# Patient Record
Sex: MALE | Race: WHITE | NOT HISPANIC OR LATINO | URBAN - METROPOLITAN AREA
[De-identification: names, ages, dates, MRNs, and addresses within clinical notes are randomized per-mention and may not be internally consistent; named-entity substitution may affect disease eponyms.]

---

## 2020-12-06 ENCOUNTER — EMERGENCY (EMERGENCY)
Facility: HOSPITAL | Age: 65
LOS: 1 days | Discharge: HOME | End: 2020-12-06
Attending: STUDENT IN AN ORGANIZED HEALTH CARE EDUCATION/TRAINING PROGRAM
Payer: COMMERCIAL

## 2020-12-06 VITALS
HEIGHT: 69 IN | DIASTOLIC BLOOD PRESSURE: 95 MMHG | WEIGHT: 164.91 LBS | OXYGEN SATURATION: 99 % | TEMPERATURE: 99 F | HEART RATE: 58 BPM | SYSTOLIC BLOOD PRESSURE: 152 MMHG | RESPIRATION RATE: 16 BRPM

## 2020-12-06 PROCEDURE — 72100 X-RAY EXAM L-S SPINE 2/3 VWS: CPT | Mod: 26

## 2020-12-06 PROCEDURE — 99284 EMERGENCY DEPT VISIT MOD MDM: CPT

## 2020-12-06 PROCEDURE — 72100 X-RAY EXAM L-S SPINE 2/3 VWS: CPT

## 2020-12-06 RX ORDER — ACETAMINOPHEN 500 MG
975 TABLET ORAL ONCE
Refills: 0 | Status: COMPLETED | OUTPATIENT
Start: 2020-12-06 | End: 2020-12-06

## 2020-12-06 RX ORDER — OXYCODONE HYDROCHLORIDE 5 MG/1
5 TABLET ORAL ONCE
Refills: 0 | Status: DISCONTINUED | OUTPATIENT
Start: 2020-12-06 | End: 2020-12-06

## 2020-12-06 RX ORDER — IBUPROFEN 200 MG
600 TABLET ORAL ONCE
Refills: 0 | Status: COMPLETED | OUTPATIENT
Start: 2020-12-06 | End: 2020-12-06

## 2020-12-06 RX ORDER — LIDOCAINE 4 G/100G
1 CREAM TOPICAL ONCE
Refills: 0 | Status: COMPLETED | OUTPATIENT
Start: 2020-12-06 | End: 2020-12-06

## 2020-12-06 RX ORDER — DIAZEPAM 5 MG
5 TABLET ORAL ONCE
Refills: 0 | Status: DISCONTINUED | OUTPATIENT
Start: 2020-12-06 | End: 2020-12-06

## 2020-12-06 RX ADMIN — Medication 975 MILLIGRAM(S): at 22:02

## 2020-12-06 RX ADMIN — OXYCODONE HYDROCHLORIDE 5 MILLIGRAM(S): 5 TABLET ORAL at 22:55

## 2020-12-06 RX ADMIN — Medication 5 MILLIGRAM(S): at 22:01

## 2020-12-06 RX ADMIN — Medication 600 MILLIGRAM(S): at 22:40

## 2020-12-06 RX ADMIN — Medication 600 MILLIGRAM(S): at 22:01

## 2020-12-06 RX ADMIN — LIDOCAINE 1 PATCH: 4 CREAM TOPICAL at 22:01

## 2020-12-06 RX ADMIN — Medication 975 MILLIGRAM(S): at 22:40

## 2020-12-06 NOTE — ED PROVIDER NOTE - PATIENT PORTAL LINK FT
You can access the FollowMyHealth Patient Portal offered by [facility] by registering at the following website: [email or web address]. By joining [facility]’s FollowMyHealth portal, you will also be able to view your health information using other applications (apps) compatible with our system.

## 2020-12-06 NOTE — ED ADULT NURSE NOTE - NSIMPLEMENTINTERV_GEN_ALL_ED
Implemented All Fall Risk Interventions:  [place] to call system. Call bell, personal items and telephone within reach. Instruct patient to call for assistance. Room bathroom lighting operational. Non-slip footwear when patient is off stretcher. Physically safe environment: no spills, clutter or unnecessary equipment. Stretcher in lowest position, wheels locked, appropriate side rails in place. Provide visual cue, wrist band, yellow gown, etc. Monitor gait and stability. Monitor for mental status changes and reorient to person, place, and time. Review medications for side effects contributing to fall risk. Reinforce activity limits and safety measures with patient and family.

## 2020-12-06 NOTE — ED PROVIDER NOTE - CLINICAL SUMMARY MEDICAL DECISION MAKING FREE TEXT BOX
Dr. Roxane Finney: 65 year old male with history of HLD presented to ED with acute atraumatic right low back pain. Pt states he had prior lumbar disc disease but uncertain what the diagnosis was. No LE numbness/weakness/paresthesia, urinary retentions, saddle anesthesia, fever, chills. Plan: Lumbar x-ray, analgesia, reassess

## 2020-12-06 NOTE — ED PROVIDER NOTE - NSFOLLOWUPINSTRUCTIONS_ED_ALL_ED_FT
Back Pain    Back pain is very common in adults. The cause of back pain is rarely dangerous and the pain often gets better over time. The cause of your back pain may not be known and may include strain of muscles or ligaments, degeneration of the spinal disks, or arthritis. Occasionally the pain may radiate down your leg(s). Over-the-counter medicines to reduce pain and inflammation are often the most helpful. Stretching and remaining active frequently helps the healing process.     SEEK IMMEDIATE MEDICAL CARE IF YOU HAVE ANY OF THE FOLLOWING SYMPTOMS: bowel or bladder control problems, unusual weakness or numbness in your arms or legs, nausea or vomiting, abdominal pain, fever, dizziness/lightheadedness.     Take Tylenol 650mg every 6 hrs as needed for pain.  Take Motrin 400-600mg every 6 hrs as needed for pain. Take with food  Take valium 5mg (1 tablet) every 8 hrs as needed for pain. Do not drink alcohol or drive after taking valium.   Oxycodone 5mg every 6 hours as needed for pain. Do not drive or consume alcohol while taking.   apply lidoderm patch to the affected area 1x per day    Follow up with spine clinic regarding your pain if it persists Back Pain    Back pain is very common in adults. The cause of back pain is rarely dangerous and the pain often gets better over time. The cause of your back pain may not be known and may include strain of muscles or ligaments, degeneration of the spinal disks, or arthritis. Occasionally the pain may radiate down your leg(s). Over-the-counter medicines to reduce pain and inflammation are often the most helpful. Stretching and remaining active frequently helps the healing process.     SEEK IMMEDIATE MEDICAL CARE IF YOU HAVE ANY OF THE FOLLOWING SYMPTOMS: bowel or bladder control problems, unusual weakness or numbness in your arms or legs, nausea or vomiting, abdominal pain, fever, dizziness/lightheadedness.     Take Tylenol 650-1000 mg every 6 hrs as needed for pain.  Take Motrin 400-600mg every 6 hrs as needed for pain. Take with food  Take Lidocaine patch, 2 times/day  Oxycodone 5mg every 6 hours as needed for pain. Do not drive or consume alcohol while taking.   apply lidoderm patch to the affected area 1x per day    Follow up with [facility] regarding your pain if it persists

## 2020-12-06 NOTE — ED PROVIDER NOTE - OBJECTIVE STATEMENT
66 y/o male hx hld works as  but is out on workers comp x 5 weeks for a broken phalynx presents to the ED for severe right sided paraspinal tenderness. patient states pain started 5 days ago, has been gradually getting worse and is now aso severe it is difficult to walk and he has been brought to his knees on standing secondary to pain. doesn't recall lifting something heavy, an inciting event or trauma. no fever/chills/radiation of the pain to the leg/abd or testicle. no weakness of the extremities or sensation deficit. no urinary or fecal incontinence or saddle anesthesia. reports having no spinal surgeries in the past, did have one injection in 2005 for back pain.

## 2020-12-06 NOTE — ED ADULT NURSE NOTE - OBJECTIVE STATEMENT
64 y/o Male presenting to the ED by EMS from home via stretcher, A&Ox3, complaining of lower back pain starting three days ago worsening in the hour before coming in. 64 y/o Male presenting to the ED by EMS from home via stretcher, A&Ox3, complaining of lower back pain starting three days ago when bending over, worsening in the hour before coming in. Pt reports the pain gets so bad he falls to his knees. When resting on the stretcher pt is at a 1/10, when he moves he going to a 10/10. Pt denies trauma to the area, CP, SOB, N/V/D, headache, dizziness, weakness, numbness, tingling in the extremities, incontinence. Pt has full ROM of all extremities. Pupils 3mm PERRL. Equal strength and sensation in all extremities. Pt appears uncomfortable on stretcher, PO medication provided. Safety and comfort measures provided, bed locked and in lowest position, side rails up for safety. Call bell within reach. Awaiting x-ray results. 64 y/o Male presenting to the ED by EMS from home via stretcher, A&Ox3, complaining of lower back pain starting three days ago when bending over, worsening in the hour before coming in. Hx of herniated disk. Pt reports the pain gets so bad he falls to his knees. When resting on the stretcher pt is at a 1/10, when he moves he going to a 10/10. Pt denies trauma to the area, CP, SOB, N/V/D, headache, dizziness, weakness, numbness, tingling in the extremities, incontinence. Pt has full ROM of all extremities. Pupils 3mm PERRL. Equal strength and sensation in all extremities. Pt appears uncomfortable on stretcher, PO medication provided. Safety and comfort measures provided, bed locked and in lowest position, side rails up for safety. Call bell within reach. Awaiting x-ray results.

## 2020-12-06 NOTE — ED PROVIDER NOTE - CONSTITUTIONAL, MLM
normal... Well appearing, awake, alert, oriented to person, place, time/situation and in no mild distress.

## 2020-12-06 NOTE — ED PROVIDER NOTE - PROGRESS NOTE DETAILS
patient reports pain improved, now 5/10. - Marcella Lerma PA-C Zac Mckinnon MD, PGY3: Patient received at resident sign out. Improved pain, xray with no fracture. I have given the pt strict return and follow up precautions. The patient has been provided with a copy of all pertinent results. The patient has been informed of all concerning signs and symptoms to return to Emergency Department, the necessity to follow up with PMD/Clinic/follow up provided within 2-3 days was explained, and the patient reports understanding of above with capacity and insight. Zac Mckinnon MD, PGY3: Patient received at resident sign out. Improved pain, xray with no fracture. pt is walking in the room. long discussion regaridn out pt management. Pt agreed with the plan. I have given the pt strict return and follow up precautions. The patient has been provided with a copy of all pertinent results. The patient has been informed of all concerning signs and symptoms to return to Emergency Department, the necessity to follow up with PMD/Clinic/follow up provided within 2-3 days was explained, and the patient reports understanding of above with capacity and insight.

## 2020-12-07 VITALS
SYSTOLIC BLOOD PRESSURE: 127 MMHG | HEART RATE: 51 BPM | DIASTOLIC BLOOD PRESSURE: 79 MMHG | RESPIRATION RATE: 16 BRPM | OXYGEN SATURATION: 99 %

## 2020-12-07 RX ORDER — OXYCODONE HYDROCHLORIDE 5 MG/1
1 TABLET ORAL
Qty: 12 | Refills: 0
Start: 2020-12-07 | End: 2020-12-09

## 2020-12-07 NOTE — ED ADULT NURSE REASSESSMENT NOTE - NS ED NURSE REASSESS COMMENT FT1
Patient able to ambulate with steady gait with no assistance. Pt initially reported increased back pain with ambulation. ED MD Finney made aware.